# Patient Record
Sex: FEMALE | Race: WHITE | NOT HISPANIC OR LATINO | Employment: FULL TIME | URBAN - METROPOLITAN AREA
[De-identification: names, ages, dates, MRNs, and addresses within clinical notes are randomized per-mention and may not be internally consistent; named-entity substitution may affect disease eponyms.]

---

## 2017-08-11 RX ORDER — ALLOPURINOL 300 MG/1
300 TABLET ORAL DAILY
COMMUNITY

## 2017-08-11 RX ORDER — SIMVASTATIN 20 MG
20 TABLET ORAL NIGHTLY
COMMUNITY

## 2017-08-11 RX ORDER — FUROSEMIDE 40 MG/1
40 TABLET ORAL DAILY
COMMUNITY

## 2017-08-11 RX ORDER — VALSARTAN AND HYDROCHLOROTHIAZIDE 320; 25 MG/1; MG/1
1 TABLET, FILM COATED ORAL DAILY
COMMUNITY

## 2017-08-11 RX ORDER — CLONIDINE HYDROCHLORIDE 0.1 MG/1
0.1 TABLET ORAL
COMMUNITY

## 2017-08-11 RX ORDER — CITALOPRAM 20 MG/1
20 TABLET ORAL DAILY
COMMUNITY

## 2017-08-11 RX ORDER — POTASSIUM CHLORIDE 20 MEQ/1
20 TABLET, EXTENDED RELEASE ORAL 2 TIMES DAILY
COMMUNITY

## 2017-08-11 RX ORDER — METOPROLOL SUCCINATE 100 MG/1
100 TABLET, EXTENDED RELEASE ORAL DAILY
COMMUNITY

## 2017-08-11 RX ORDER — SPIRONOLACTONE 50 MG/1
50 TABLET, FILM COATED ORAL DAILY
COMMUNITY

## 2017-08-14 ENCOUNTER — OFFICE VISIT (OUTPATIENT)
Dept: NEUROSURGERY | Facility: CLINIC | Age: 57
End: 2017-08-14

## 2017-08-14 VITALS
TEMPERATURE: 97.4 F | BODY MASS INDEX: 43.63 KG/M2 | SYSTOLIC BLOOD PRESSURE: 126 MMHG | DIASTOLIC BLOOD PRESSURE: 84 MMHG | WEIGHT: 278 LBS | HEIGHT: 67 IN

## 2017-08-14 DIAGNOSIS — M53.9 MULTILEVEL DEGENERATIVE DISC DISEASE: Primary | ICD-10-CM

## 2017-08-14 DIAGNOSIS — M51.36 BULGING LUMBAR DISC: ICD-10-CM

## 2017-08-14 PROBLEM — M51.369 BULGING LUMBAR DISC: Status: ACTIVE | Noted: 2017-08-14

## 2017-08-14 PROCEDURE — 99203 OFFICE O/P NEW LOW 30 MIN: CPT | Performed by: NEUROLOGICAL SURGERY

## 2017-08-14 RX ORDER — HYDROCODONE BITARTRATE AND ACETAMINOPHEN 7.5; 325 MG/1; MG/1
1 TABLET ORAL EVERY 8 HOURS PRN
Qty: 45 TABLET | Refills: 0 | Status: SHIPPED | OUTPATIENT
Start: 2017-08-14

## 2017-08-14 RX ORDER — TIZANIDINE 4 MG/1
4 TABLET ORAL NIGHTLY PRN
COMMUNITY

## 2017-08-14 RX ORDER — METHOCARBAMOL 750 MG/1
750 TABLET, FILM COATED ORAL NIGHTLY
Qty: 30 TABLET | Refills: 0 | Status: SHIPPED | OUTPATIENT
Start: 2017-08-14 | End: 2017-09-25 | Stop reason: SDUPTHER

## 2017-08-14 RX ORDER — NAPROXEN 500 MG/1
500 TABLET ORAL 2 TIMES DAILY WITH MEALS
COMMUNITY
End: 2017-08-14 | Stop reason: CLARIF

## 2017-08-14 RX ORDER — NABUMETONE 750 MG/1
750 TABLET, FILM COATED ORAL 2 TIMES DAILY
Qty: 60 TABLET | Refills: 0 | Status: SHIPPED | OUTPATIENT
Start: 2017-08-14 | End: 2017-09-25 | Stop reason: SDUPTHER

## 2017-08-14 NOTE — PROGRESS NOTES
Sandra Inabnitt  1960  [id number]      Chief Complaint   Patient presents with   • Back Pain       HISTORY OF PRESENT ILLNESS:  [This is a 56-year-old female who has a history of recurring chronic low back pain.  This particular episode has been of 3 weeks duration.  Walking for long periods of time or sitting exacerbate her symptoms.  In the past she is been to physical therapy with which has helped somewhat.  She has not been to physical therapy this time.  The last episode that she had that was quite this severe was several years ago.    The pain is axial.  I questioned her repeatedly yet received is no history that would suggest radiculopathy.  She has seen a neurosurgeon in [facility] who has scheduled surgery for her in 2 months when she described as plates and screws.  I'm seeing her for a second opinion.]    Past Medical History:   Diagnosis Date   • CKD (chronic kidney disease)    • DDD (degenerative disc disease), lumbar    • Depression    • Diverticulitis    • Hyperlipidemia    • Hypertension    • Lumbar disc disease with radiculopathy    • Obesity    • Peripheral edema    • PUD (peptic ulcer disease)    • Vitamin D deficiency disease        Past Surgical History:   Procedure Laterality Date   • APPENDECTOMY     •  SECTION     • COLECTOMY PARTIAL / TOTAL     • TUBAL ABDOMINAL LIGATION         History reviewed. No pertinent family history.    Social History     Social History   • Marital status:      Spouse name: N/A   • Number of children: N/A   • Years of education: N/A     Occupational History   • Not on file.     Social History Main Topics   • Smoking status: Never Smoker   • Smokeless tobacco: Not on file   • Alcohol use No   • Drug use: No   • Sexual activity: Defer     Other Topics Concern   • Not on file     Social History Narrative       No Known Allergies      Current Outpatient Prescriptions:   •  allopurinol (ZYLOPRIM) 300 MG tablet, Take 300 mg by mouth Daily., Disp:  , Rfl:   •  citalopram (CeleXA) 20 MG tablet, Take 20 mg by mouth Daily., Disp: , Rfl:   •  CloNIDine (CATAPRES) 0.1 MG tablet, Take 0.1 mg by mouth every night at bedtime., Disp: , Rfl:   •  furosemide (LASIX) 40 MG tablet, Take 40 mg by mouth Daily., Disp: , Rfl:   •  metoprolol succinate XL (TOPROL-XL) 100 MG 24 hr tablet, Take 100 mg by mouth Daily., Disp: , Rfl:   •  naproxen (NAPROSYN) 500 MG tablet, Take 500 mg by mouth 2 (Two) Times a Day With Meals., Disp: , Rfl:   •  potassium chloride (K-DUR,KLOR-CON) 20 MEQ CR tablet, Take 20 mEq by mouth 2 (Two) Times a Day., Disp: , Rfl:   •  simvastatin (ZOCOR) 20 MG tablet, Take 20 mg by mouth Every Night., Disp: , Rfl:   •  spironolactone (ALDACTONE) 50 MG tablet, Take 50 mg by mouth Daily., Disp: , Rfl:   •  tiZANidine (ZANAFLEX) 4 MG tablet, Take 4 mg by mouth At Night As Needed for Muscle Spasms., Disp: , Rfl:   •  valsartan-hydrochlorothiazide (DIOVAN-HCT) 320-25 MG per tablet, Take 1 tablet by mouth Daily., Disp: , Rfl:     Review of Systems   Constitutional: Negative for activity change, appetite change, chills, diaphoresis, fatigue, fever and unexpected weight change.   HENT: Negative for congestion, dental problem, drooling, ear discharge, ear pain, facial swelling, hearing loss, mouth sores, nosebleeds, postnasal drip, rhinorrhea, sinus pressure, sneezing, sore throat, tinnitus, trouble swallowing and voice change.    Eyes: Negative for photophobia, pain, discharge, redness, itching and visual disturbance.   Respiratory: Negative for apnea, cough, choking, chest tightness, shortness of breath, wheezing and stridor.    Cardiovascular: Negative for chest pain, palpitations and leg swelling.   Gastrointestinal: Negative for abdominal distention, abdominal pain, anal bleeding, blood in stool, constipation, diarrhea, nausea, rectal pain and vomiting.   Endocrine: Negative for cold intolerance, heat intolerance, polydipsia, polyphagia and polyuria.  "  Genitourinary: Negative for decreased urine volume, difficulty urinating, dysuria, enuresis, flank pain, frequency, genital sores, hematuria and urgency.   Musculoskeletal: Positive for back pain. Negative for arthralgias, gait problem, joint swelling, myalgias, neck pain and neck stiffness.   Skin: Negative for color change, pallor, rash and wound.   Allergic/Immunologic: Negative for environmental allergies, food allergies and immunocompromised state.   Neurological: Negative for dizziness, tremors, seizures, syncope, facial asymmetry, speech difficulty, weakness, light-headedness, numbness and headaches.   Hematological: Negative for adenopathy. Does not bruise/bleed easily.   Psychiatric/Behavioral: Negative for agitation, behavioral problems, confusion, decreased concentration, dysphoric mood, hallucinations, self-injury, sleep disturbance and suicidal ideas. The patient is not nervous/anxious and is not hyperactive.        Vitals:    08/14/17 1141   BP: 126/84   BP Location: Left arm   Patient Position: Sitting   Temp: 97.4 °F (36.3 °C)   TempSrc: Temporal Artery    Weight: 278 lb (126 kg)   Height: 67\" (170.2 cm)       Neurological Examination:  Mental status/speech: The patient is alert and oriented.  Speech is clear without aphysia or dysarthria.  No overt cognitive deficits.    Cranial nerve examination:    Olfaction: Smell is intact.  Vision: Vision is intact without visual field abnormalities.  Funduscopic examination is normal.  No pupillary irregularity.  Ocular motor examination: The extraocular muscles are intact.  There is no diplopia.  The pupil is round and reactive to both light and accommodation.  There is no nystagmus.  Facial movement/sensation: There is no facial weakness.  Sensation is intact in the first, second, and third divisions of the trigeminal nerve.  The corneal reflex is intact.  Auditory: Hearing is intact to finger rub bilaterally.  Cranial nerves IX, X, XI, XII: Phonation is " normal.  No dysphagia.  Tongue is protruded in the midline without atrophy.  The gag reflex is intact.  Shoulder shrug is normal.    Musculoligamentous ligamentous examination: She is obese with limitation of range of motion of lumbar spine.  Straight leg raising, [place] and flip test are negative.  I am unable to find any evidence of weakness, sensory loss or reflex asymmetry.  Her gait is normal.    The lumbar MRI shows degenerative disc disease.  It is particularly severe at L3-L4.  She has a disc protrusion deviating toward the right at this level.  She has facet arthrosis and mild disc degenerations otherwise.    Medical Decision Making:     Diagnostic Data Set:  Degenerative disc disease L3-L4      Assessment:  Although the MRI shows disc protrusion to the right at L3-L4 she has no symptoms of radiculopathy.  The primary complaint she has is severe, non-radicular back pain.  For that reason I do not believe that surgical intervention is in her best interest.  There is no spinal operation that is going to help back pain in and of itself.  Indeed the question would be which is the symptomatic level.  L3-L4 certainly is not normal and most likely is the culprit.  However before any consideration of surgery a full conservative course should be exhausted and this level should be determined to be the area of interest and symptomatic.    I have sent her to physical therapy, provided a prescription of Relafen 750 mg twice a day, Robaxin 750 mg at night and Lortab 7.5/325 (45) Q8H when necessary.  She is to call me in 2 weeks.  At that time if she is not better I would recommend epidural steroid injection.  If it any time she should develop radiculopathy in the right anterior thigh surgery would be a consideration.  At the present time however I cannot confirm that surgery is an absolute requirement.,          Recommendations:  [ ]        I greatly appreciate the opportunity to see and evaluate this individual.  If you  have questions or concerns regarding issues that I may have overlooked please call me at any time: [phone].  Kenan Bailey M.D.  [facility]  [address] [place] [address]    Scribed for Maximino Bailey MD by Lissett Stoll CMA. 8/14/2017  12:08 PM    I have read and concur with the information provided by the scribe.  Maximino Bailey MD

## 2017-08-14 NOTE — PATIENT INSTRUCTIONS
Call Dr. Bailey on a Monday or Tuesday.   Ask for Basilia,  and leave a message for  Dr. Bailey.  He will call you back at the end of the day as soon as he can.     [phone]

## 2017-09-25 DIAGNOSIS — M51.36 BULGING LUMBAR DISC: ICD-10-CM

## 2017-09-25 DIAGNOSIS — M53.9 MULTILEVEL DEGENERATIVE DISC DISEASE: ICD-10-CM

## 2017-09-25 RX ORDER — NABUMETONE 750 MG/1
TABLET, FILM COATED ORAL
Qty: 60 TABLET | Refills: 0 | Status: SHIPPED | OUTPATIENT
Start: 2017-09-25 | End: 2017-10-27 | Stop reason: SDUPTHER

## 2017-09-25 RX ORDER — METHOCARBAMOL 750 MG/1
TABLET, FILM COATED ORAL
Qty: 30 TABLET | Refills: 0 | Status: SHIPPED | OUTPATIENT
Start: 2017-09-25 | End: 2017-10-27 | Stop reason: SDUPTHER

## 2017-10-27 DIAGNOSIS — M53.9 MULTILEVEL DEGENERATIVE DISC DISEASE: ICD-10-CM

## 2017-10-27 DIAGNOSIS — M51.36 BULGING LUMBAR DISC: ICD-10-CM

## 2017-10-27 RX ORDER — METHOCARBAMOL 750 MG/1
TABLET, FILM COATED ORAL
Qty: 30 TABLET | Refills: 0 | Status: SHIPPED | OUTPATIENT
Start: 2017-10-27 | End: 2017-12-04 | Stop reason: SDUPTHER

## 2017-10-27 RX ORDER — NABUMETONE 750 MG/1
TABLET, FILM COATED ORAL
Qty: 60 TABLET | Refills: 0 | Status: SHIPPED | OUTPATIENT
Start: 2017-10-27 | End: 2017-12-04 | Stop reason: SDUPTHER

## 2017-10-27 NOTE — TELEPHONE ENCOUNTER
Provider:  Leo  Caller:  Automated refill request  Surgery:  NA  Surgery Date:    Last visit:  08/14/17  Next visit: NA    Reason for call:         Requested Prescriptions     Pending Prescriptions Disp Refills   • methocarbamol (ROBAXIN) 750 MG tablet [Pharmacy Med Name: METHOCARBAMOL 750MG TABLET 750 TAB] 30 tablet 0     Sig: TAKE 1 TABLET BY MOUTH EVERY NIGHT   • nabumetone (RELAFEN) 750 MG tablet [Pharmacy Med Name: NABUMETONE 750MG TABLET 750 TAB] 60 tablet 0     Sig: TAKE 1 TABLET TWICE DAILY

## 2017-12-04 DIAGNOSIS — M51.36 BULGING LUMBAR DISC: ICD-10-CM

## 2017-12-04 DIAGNOSIS — M53.9 MULTILEVEL DEGENERATIVE DISC DISEASE: ICD-10-CM

## 2017-12-04 RX ORDER — NABUMETONE 750 MG/1
TABLET, FILM COATED ORAL
Qty: 60 TABLET | Refills: 0 | Status: SHIPPED | OUTPATIENT
Start: 2017-12-04 | End: 2018-01-02 | Stop reason: SDUPTHER

## 2017-12-04 RX ORDER — METHOCARBAMOL 750 MG/1
TABLET, FILM COATED ORAL
Qty: 30 TABLET | Refills: 0 | Status: SHIPPED | OUTPATIENT
Start: 2017-12-04 | End: 2018-01-02 | Stop reason: SDUPTHER

## 2017-12-04 NOTE — TELEPHONE ENCOUNTER
Provider:  Leo  Caller:  Automated refill request  Surgery:  NA  Surgery Date:    Last visit:  08/14/17  Next visit:      Reason for call:         Requested Prescriptions     Pending Prescriptions Disp Refills   • methocarbamol (ROBAXIN) 750 MG tablet [Pharmacy Med Name: METHOCARBAMOL 750MG TABLET 750 TAB] 30 tablet 0     Sig: TAKE 1 TABLET EVERY NIGHT   • nabumetone (RELAFEN) 750 MG tablet [Pharmacy Med Name: NABUMETONE 750MG TABLET 750 TAB] 60 tablet 0     Sig: TAKE 1 TABLET TWICE DAILY

## 2018-01-02 DIAGNOSIS — M53.9 MULTILEVEL DEGENERATIVE DISC DISEASE: ICD-10-CM

## 2018-01-02 DIAGNOSIS — M51.36 BULGING LUMBAR DISC: ICD-10-CM

## 2018-01-02 RX ORDER — METHOCARBAMOL 750 MG/1
TABLET, FILM COATED ORAL
Qty: 30 TABLET | Refills: 0 | Status: SHIPPED | OUTPATIENT
Start: 2018-01-02

## 2018-01-02 RX ORDER — NABUMETONE 750 MG/1
TABLET, FILM COATED ORAL
Qty: 60 TABLET | Refills: 0 | Status: SHIPPED | OUTPATIENT
Start: 2018-01-02 | End: 2018-02-01 | Stop reason: SDUPTHER

## 2018-01-02 NOTE — TELEPHONE ENCOUNTER
Provider:  Leo  Caller:  Leti   Last visit:  08/14/17  Next visit:  tbd        Reason for call:         Automated refill requests.

## 2018-02-01 DIAGNOSIS — M53.9 MULTILEVEL DEGENERATIVE DISC DISEASE: ICD-10-CM

## 2018-02-01 DIAGNOSIS — M51.36 BULGING LUMBAR DISC: ICD-10-CM

## 2018-02-01 RX ORDER — NABUMETONE 750 MG/1
TABLET, FILM COATED ORAL
Qty: 60 TABLET | Refills: 3 | Status: SHIPPED | OUTPATIENT
Start: 2018-02-01 | End: 2018-05-30 | Stop reason: SDUPTHER

## 2018-02-01 NOTE — TELEPHONE ENCOUNTER
Provider:  Leo  Caller:  Leti   Last visit:  08/14/17  Next visit:  tbd         Reason for call:         Automated refill request

## 2018-02-28 DIAGNOSIS — M53.9 MULTILEVEL DEGENERATIVE DISC DISEASE: ICD-10-CM

## 2018-02-28 DIAGNOSIS — M51.36 BULGING LUMBAR DISC: ICD-10-CM

## 2018-03-01 RX ORDER — METHOCARBAMOL 750 MG/1
TABLET, FILM COATED ORAL
Qty: 30 TABLET | Refills: 0 | OUTPATIENT
Start: 2018-03-01

## 2018-03-01 NOTE — TELEPHONE ENCOUNTER
Provider:  Jarek  Caller: Leti  Time of call:   02/28/18  Phone #:    Surgery: no   Surgery Date:    Last visit: 08/14/17    Next visit: None    CATARINO:         Reason for call:     Refill request

## 2018-05-30 DIAGNOSIS — M51.36 BULGING LUMBAR DISC: ICD-10-CM

## 2018-05-30 DIAGNOSIS — M53.9 MULTILEVEL DEGENERATIVE DISC DISEASE: ICD-10-CM

## 2018-05-30 RX ORDER — NABUMETONE 750 MG/1
TABLET, FILM COATED ORAL
Qty: 60 TABLET | Refills: 0 | Status: SHIPPED | OUTPATIENT
Start: 2018-05-30 | End: 2018-06-27 | Stop reason: SDUPTHER

## 2018-06-27 DIAGNOSIS — M53.9 MULTILEVEL DEGENERATIVE DISC DISEASE: ICD-10-CM

## 2018-06-27 DIAGNOSIS — M51.36 BULGING LUMBAR DISC: ICD-10-CM

## 2018-06-27 NOTE — TELEPHONE ENCOUNTER
Provider: Leo   Caller: pharmacy  Time of call:     Phone #: [phone]    Surgery: n/a   Surgery Date:    Last visit: 8/14/17    Next visit:     CATARINO:         Reason for call:       Refill request

## 2018-06-28 RX ORDER — NABUMETONE 750 MG/1
TABLET, FILM COATED ORAL
Qty: 60 TABLET | Refills: 0 | Status: SHIPPED | OUTPATIENT
Start: 2018-06-28 | End: 2018-08-14 | Stop reason: SDUPTHER

## 2018-06-28 NOTE — TELEPHONE ENCOUNTER
Called pt and left detailed vm adv that we have provided 1 final refill on the nabumetone and that final refills would need to come from her pcp

## 2018-08-10 DIAGNOSIS — M51.36 BULGING LUMBAR DISC: ICD-10-CM

## 2018-08-10 DIAGNOSIS — M53.9 MULTILEVEL DEGENERATIVE DISC DISEASE: ICD-10-CM

## 2018-08-10 RX ORDER — NABUMETONE 750 MG/1
TABLET, FILM COATED ORAL
Qty: 60 TABLET | Refills: 11 | OUTPATIENT
Start: 2018-08-10

## 2018-08-14 DIAGNOSIS — M51.36 BULGING LUMBAR DISC: ICD-10-CM

## 2018-08-14 DIAGNOSIS — M53.9 MULTILEVEL DEGENERATIVE DISC DISEASE: ICD-10-CM

## 2018-08-14 RX ORDER — NABUMETONE 750 MG/1
TABLET, FILM COATED ORAL
Qty: 60 TABLET | Refills: 0 | Status: SHIPPED | OUTPATIENT
Start: 2018-08-14 | End: 2018-09-14 | Stop reason: SDUPTHER

## 2018-08-14 NOTE — TELEPHONE ENCOUNTER
Provider: Leo   Caller: pharmacy  Time of call: n/a    Phone #: n/a    Last visit: 8/14/17    Next visit: none  Reason for call:       Automated refill request.

## 2018-09-13 DIAGNOSIS — M51.36 BULGING LUMBAR DISC: ICD-10-CM

## 2018-09-13 DIAGNOSIS — M53.9 MULTILEVEL DEGENERATIVE DISC DISEASE: ICD-10-CM

## 2018-09-13 RX ORDER — NABUMETONE 750 MG/1
TABLET, FILM COATED ORAL
Qty: 60 TABLET | Refills: 0 | OUTPATIENT
Start: 2018-09-13

## 2018-09-13 NOTE — TELEPHONE ENCOUNTER
Provider: Leo  Caller:  Automated refill request  Surgery:  NA  Surgery Date:    Last visit:  08/14/17  Next visit: NA    Reason for call:         Requested Prescriptions     Pending Prescriptions Disp Refills   • nabumetone (RELAFEN) 750 MG tablet [Pharmacy Med Name: NABUMETONE 750MG TABLET 750 TAB] 60 tablet 11     Sig: TAKE 1 TABLET TWICE DAILY

## 2018-09-14 DIAGNOSIS — M51.36 BULGING LUMBAR DISC: ICD-10-CM

## 2018-09-14 DIAGNOSIS — M53.9 MULTILEVEL DEGENERATIVE DISC DISEASE: ICD-10-CM

## 2018-09-14 RX ORDER — NABUMETONE 750 MG/1
TABLET, FILM COATED ORAL
Qty: 60 TABLET | Refills: 0 | Status: SHIPPED | OUTPATIENT
Start: 2018-09-14 | End: 2018-10-13 | Stop reason: SDUPTHER

## 2018-09-14 NOTE — TELEPHONE ENCOUNTER
Provider: Shani   Caller: pharmacy     Phone #: [phone]   Surgery: n/a     Last visit: 8/14/17     Next visit: n/a    CATARINO:         Reason for call:         Requested Prescriptions     Pending Prescriptions Disp Refills   • nabumetone (RELAFEN) 750 MG tablet [Pharmacy Med Name: NABUMETONE 750MG TABLET 750 TAB] 60 tablet 0     Sig: TAKE 1 TABLET TWICE DAILY

## 2018-10-13 DIAGNOSIS — M53.9 MULTILEVEL DEGENERATIVE DISC DISEASE: ICD-10-CM

## 2018-10-13 DIAGNOSIS — M51.36 BULGING LUMBAR DISC: ICD-10-CM

## 2018-10-13 NOTE — TELEPHONE ENCOUNTER
Provider: Shani   Caller: pharmacy     Phone #: [phone]   Surgery: n/a     Last visit: 8/14/17     Next visit: n/a    Reason for call:         Automated refill request.

## 2018-10-15 RX ORDER — NABUMETONE 750 MG/1
TABLET, FILM COATED ORAL
Qty: 60 TABLET | Refills: 11 | Status: SHIPPED | OUTPATIENT
Start: 2018-10-15

## 2024-04-02 ENCOUNTER — OFFICE VISIT (OUTPATIENT)
Dept: CARDIOLOGY | Facility: CLINIC | Age: 64
End: 2024-04-02
Payer: MEDICARE

## 2024-04-02 VITALS
BODY MASS INDEX: 32.71 KG/M2 | OXYGEN SATURATION: 95 % | WEIGHT: 208.4 LBS | DIASTOLIC BLOOD PRESSURE: 78 MMHG | HEART RATE: 103 BPM | SYSTOLIC BLOOD PRESSURE: 138 MMHG | HEIGHT: 67 IN

## 2024-04-02 DIAGNOSIS — I63.81 LACUNAR INFARCTION: ICD-10-CM

## 2024-04-02 DIAGNOSIS — R53.82 CHRONIC FATIGUE: ICD-10-CM

## 2024-04-02 DIAGNOSIS — R06.00 DYSPNEA, UNSPECIFIED TYPE: ICD-10-CM

## 2024-04-02 DIAGNOSIS — G47.33 OSA ON CPAP: ICD-10-CM

## 2024-04-02 DIAGNOSIS — E78.2 MIXED HYPERLIPIDEMIA: ICD-10-CM

## 2024-04-02 DIAGNOSIS — G47.9 SLEEPING DIFFICULTIES: ICD-10-CM

## 2024-04-02 DIAGNOSIS — R06.02 SHORTNESS OF BREATH: ICD-10-CM

## 2024-04-02 DIAGNOSIS — G47.10 HYPERSOMNIA: ICD-10-CM

## 2024-04-02 DIAGNOSIS — R07.2 PRECORDIAL PAIN: ICD-10-CM

## 2024-04-02 DIAGNOSIS — I10 ESSENTIAL HYPERTENSION: Primary | ICD-10-CM

## 2024-04-02 PROBLEM — E78.5 HYPERLIPIDEMIA: Status: ACTIVE | Noted: 2024-04-02

## 2024-04-02 PROBLEM — I63.9 CVA (CEREBRAL VASCULAR ACCIDENT): Status: ACTIVE | Noted: 2024-04-02

## 2024-04-02 PROBLEM — I63.9 CVA (CEREBRAL VASCULAR ACCIDENT): Status: RESOLVED | Noted: 2024-04-02 | Resolved: 2024-04-02

## 2024-04-02 PROBLEM — E55.9 VITAMIN D DEFICIENCY: Status: ACTIVE | Noted: 2024-04-02

## 2024-04-02 PROCEDURE — 93000 ELECTROCARDIOGRAM COMPLETE: CPT | Performed by: INTERNAL MEDICINE

## 2024-04-02 PROCEDURE — 3075F SYST BP GE 130 - 139MM HG: CPT | Performed by: INTERNAL MEDICINE

## 2024-04-02 PROCEDURE — 3078F DIAST BP <80 MM HG: CPT | Performed by: INTERNAL MEDICINE

## 2024-04-02 PROCEDURE — 99204 OFFICE O/P NEW MOD 45 MIN: CPT | Performed by: INTERNAL MEDICINE

## 2024-04-02 RX ORDER — BETAMETHASONE DIPROPIONATE 0.5 MG/G
CREAM TOPICAL
COMMUNITY

## 2024-04-02 RX ORDER — ASPIRIN 81 MG/1
81 TABLET ORAL DAILY
Qty: 30 TABLET | Refills: 11 | Status: SHIPPED | OUTPATIENT
Start: 2024-04-02

## 2024-04-02 RX ORDER — DULOXETIN HYDROCHLORIDE 60 MG/1
60 CAPSULE, DELAYED RELEASE ORAL 2 TIMES DAILY
COMMUNITY

## 2024-04-02 RX ORDER — CLOBETASOL PROPIONATE 0.46 MG/ML
SOLUTION TOPICAL
COMMUNITY
Start: 2023-11-02

## 2024-04-02 RX ORDER — TOPIRAMATE 50 MG/1
50 TABLET, FILM COATED ORAL NIGHTLY
COMMUNITY

## 2024-04-02 RX ORDER — KETOCONAZOLE 20 MG/ML
SHAMPOO TOPICAL EVERY OTHER DAY
COMMUNITY

## 2024-04-02 RX ORDER — METOPROLOL SUCCINATE 50 MG/1
1 TABLET, EXTENDED RELEASE ORAL DAILY
COMMUNITY

## 2024-04-02 NOTE — PROGRESS NOTES
Subjective   Sandra Nova is a 63 y.o. female     Chief Complaint   Patient presents with   • Establish Care     Here for eval. Per pcp   • Chest Pain   • Shortness of Breath       PROBLEM LIST:      Chest Pain  Shortness of breath  HTN  Hyperlipidemia  DOUG, uses CPAP. Followed by Dr. Molina.   Remote lacunar infarct left thalamus per MRI brain at [facility] on 10-3-2018  Peptic ulcer disease s/p partial colectomy    Denies Rheumatic / Scarlet fever    Specialty Problems    None        HPI:    Ms. Sandra Lopez is a 63-year-old female patient of Dr. Nahomi Luna and Dr. Mariya Merchant seen today for evaluation of chest pain and dyspnea.    Ms. Lopez describes chest discomfort of several months duration.  She will have a somewhat sharp and relatively well localized discomfort in the mid axillary line on the left which is sometimes accompanied by a somewhat more vague and less localized retrosternal discomfort.  Symptoms typically occur with physical activity, are not positional or pleuritic.  However, the patient did note chest discomfort in the axillary line when she took a deep breath as part of her physical exam.    The patient describes progressive exertional dyspnea and a progressive decline in functional capacity over period of the last 3 or 4 months.  She now describes class III dyspnea without cough or wheezing.  When chest discomfort is precipitated by activity she notices a significant increase in dyspnea.  She describes no orthopnea but symptoms possibly compatible with paroxysmal nocturnal dyspnea.  She also has symptoms more typical of obstructive sleep apnea.  Ms. Lopez also relates left lower extremity swelling for a period of years.    The patient was hospitalized in 2018 with focal neurologic symptoms.  She was felt to have a TIA at that time.  MRI of the head demonstrated a prior lacunar infarct.  Echocardiography was performed at that time with no obvious cardiac source of embolism but the  patient never had rhythm monitoring and it did not appear that she had a bubble study to assess for right to left shunting.                      PRIOR MEDICATIONS    Current Outpatient Medications on File Prior to Visit   Medication Sig Dispense Refill   • allopurinol (ZYLOPRIM) 300 MG tablet Take 1 tablet by mouth Daily.     • betamethasone dipropionate (DIPROSONE) 0.05 % cream APPLY A THIN LAYER TO THE AFFECTED AREA(S) BY TOPICAL ROUTE ONCE DAILY AS NEEDED     • clobetasol (TEMOVATE) 0.05 % external solution APPLY TO THE AFFECTED SCALP AREA BY TOPICAL ROUTE 2 TIMES PER DAY IN THE MORNING AND EVENING     • CloNIDine (CATAPRES) 0.1 MG tablet Take 1 tablet by mouth Daily As Needed.     • diclofenac (VOLTAREN) 50 MG EC tablet Take 1 tablet by mouth 2 (Two) Times a Day As Needed.     • DULoxetine (CYMBALTA) 60 MG capsule Take 1 capsule by mouth 2 (Two) Times a Day.     • furosemide (LASIX) 40 MG tablet Take 1 tablet by mouth Daily.     • ketoconazole (NIZORAL) 2 % shampoo Apply  topically to the appropriate area as directed Every Other Day.     • methocarbamol (ROBAXIN) 750 MG tablet TAKE 1 TABLET EVERY NIGHT (Patient taking differently: Take 1 tablet by mouth 2 (Two) Times a Day As Needed.) 30 tablet 0   • metoprolol succinate XL (TOPROL-XL) 50 MG 24 hr tablet Take 1 tablet by mouth Daily.     • simvastatin (ZOCOR) 20 MG tablet Take 1 tablet by mouth Every Night.     • topiramate (TOPAMAX) 50 MG tablet Take 1 tablet by mouth Every Night.     • [DISCONTINUED] citalopram (CeleXA) 20 MG tablet Take 20 mg by mouth Daily.     • [DISCONTINUED] HYDROcodone-acetaminophen (NORCO) 7.5-325 MG per tablet Take 1 tablet by mouth Every 8 (Eight) Hours As Needed for Moderate Pain (4-6). 45 tablet 0   • [DISCONTINUED] metoprolol succinate XL (TOPROL-XL) 100 MG 24 hr tablet Take 100 mg by mouth Daily.     • [DISCONTINUED] nabumetone (RELAFEN) 750 MG tablet TAKE 1 TABLET TWICE DAILY 60 tablet 11   • [DISCONTINUED] potassium chloride  (K-DUR,KLOR-CON) 20 MEQ CR tablet Take 20 mEq by mouth 2 (Two) Times a Day.     • [DISCONTINUED] spironolactone (ALDACTONE) 50 MG tablet Take 50 mg by mouth Daily.     • [DISCONTINUED] tiZANidine (ZANAFLEX) 4 MG tablet Take 4 mg by mouth At Night As Needed for Muscle Spasms.     • [DISCONTINUED] valsartan-hydrochlorothiazide (DIOVAN-HCT) 320-25 MG per tablet Take 1 tablet by mouth Daily.       No current facility-administered medications on file prior to visit.       ALLERGIES:    Patient has no known allergies.    PAST MEDICAL HISTORY:    Past Medical History:   Diagnosis Date   • CKD (chronic kidney disease)    • DDD (degenerative disc disease), lumbar    • Depression    • Diverticulitis    • Hyperlipidemia    • Hypertension    • Lumbar disc disease with radiculopathy    • Obesity    • Peripheral edema    • PUD (peptic ulcer disease)    • Sleep apnea     uses CPAP. followed by Dr. Molina.   • Stroke    • Vitamin D deficiency disease        SURGICAL HISTORY:    Past Surgical History:   Procedure Laterality Date   • APPENDECTOMY     •  SECTION     • COLECTOMY PARTIAL / TOTAL     • TUBAL ABDOMINAL LIGATION         SOCIAL HISTORY:    Social History     Socioeconomic History   • Marital status:    Tobacco Use   • Smoking status: Never   • Smokeless tobacco: Never   Substance and Sexual Activity   • Alcohol use: No   • Drug use: No   • Sexual activity: Defer       FAMILY HISTORY:    Family History   Problem Relation Age of Onset   • Heart attack Mother    • Hypertension Mother    • Atrial fibrillation Mother    • Hypertension Father    • Diabetes Father    • Kidney disease Father        Review of Systems   Constitutional:  Positive for fatigue. Negative for chills, diaphoresis, fever and unexpected weight change.   HENT: Negative.     Eyes:  Positive for visual disturbance (glasses).   Respiratory:  Positive for shortness of breath.    Cardiovascular:  Positive for chest pain (occas. pain to center of  "chest \"but nothing major\". usully occurs with activity and resolves with stopping. lasts sec's. Episodes couple x a week. Increased freq. first noticed a couple of mo. ago.) and leg swelling (left for \"years\" but worsening). Negative for palpitations.   Gastrointestinal:  Negative for blood in stool (denies melena,hemoptysis), constipation and diarrhea.   Endocrine: Negative for cold intolerance and heat intolerance.   Genitourinary: Negative.    Musculoskeletal:  Positive for arthralgias and myalgias.   Skin: Negative.    Allergic/Immunologic: Negative.    Neurological: Negative.    Hematological:  Bruises/bleeds easily.   Psychiatric/Behavioral:  Positive for sleep disturbance.        VISIT VITALS:  Vitals:    04/02/24 1516   BP: 138/78   BP Location: Left arm   Patient Position: Sitting   Pulse: 103   SpO2: 95%   Weight: 94.5 kg (208 lb 6.4 oz)   Height: 170.2 cm (67\")      /78 (BP Location: Left arm, Patient Position: Sitting)   Pulse 103   Ht 170.2 cm (67\")   Wt 94.5 kg (208 lb 6.4 oz)   SpO2 95%   BMI 32.64 kg/m²     RECENT LABS:    Objective       Physical Exam  Vitals and nursing note reviewed.   Constitutional:       General: She is not in acute distress.     Appearance: She is well-developed.   HENT:      Head: Normocephalic and atraumatic.   Eyes:      Conjunctiva/sclera: Conjunctivae normal.      Pupils: Pupils are equal, round, and reactive to light.      Comments: No ptosis or lid lag  Extra ocular motions intact  CLIFTON   Neck:      Vascular: No carotid bruit, hepatojugular reflux or JVD.      Trachea: No tracheal deviation.      Comments: Nl. Carotid upstrokes  Cardiovascular:      Rate and Rhythm: Normal rate and regular rhythm.      Pulses:           Radial pulses are 2+ on the right side and 2+ on the left side.      Heart sounds: Normal heart sounds, S1 normal and S2 normal. No murmur heard.     No friction rub. No S3 or S4 sounds.   Pulmonary:      Effort: Pulmonary effort is normal. "      Breath sounds: Normal breath sounds. Examination of the right-lower field reveals rhonchi. Rhonchi (scant) present. No wheezing or rales.      Comments: Nl. Expir. Phase  Nl. Breath sound intensity  Abdominal:      General: Bowel sounds are normal. There is no distension or abdominal bruit.      Palpations: Abdomen is soft. There is no mass.      Tenderness: There is no abdominal tenderness. There is no guarding or rebound.      Comments: No organomegaly  Palpable liver edge 2 cm below costal margin on deep inspiration   Musculoskeletal:         General: No tenderness or deformity. Normal range of motion.      Cervical back: Normal range of motion and neck supple.      Right lower leg: Edema present.      Left lower leg: No edema.      Comments: LLE, lymphedema, palpable PT pedal pulse  RLE, trace edema, palpable PT pedal pulse   Skin:     General: Skin is warm and dry.      Coloration: Skin is not pale.      Findings: No erythema or rash.   Neurological:      Mental Status: She is alert and oriented to person, place, and time.   Psychiatric:         Behavior: Behavior normal.         Thought Content: Thought content normal.         Judgment: Judgment normal.         ECG 12 Lead    Date/Time: 4/2/2024 3:31 PM  Performed by: Jovany Biggs MD    Authorized by: Jovany Biggs MD  Comparison: compared with previous ECG from 10/3/2018  Comments: Sinus at 87.  Minor nonspecific IVCD.  Nonspecific T wave changes.  A single PVC.  T wave abnormality is more pronounced than on the patient's EKG from 2018.  Also that study appeared to demonstrate mild ST segment elevation and GA depression suggesting the possibility of pericarditis.  These changes are no longer present.          Assessment & Plan   #1.  Chest pain.  The patient describes chest symptoms atypical for angina.  However, symptoms are frequently associated with exertion and accompanied by shortness of breath.  Therefore, ischemia needs to be evaluated  as a cause.  Because of class III exertional dyspnea we will perform pharmacologic stress testing.  The patient will start aspirin 81 mg daily.  We will not give her prescription for nitroglycerin as her symptoms are so short-lived.    2.  Increased exertional dyspnea and decreased functional capacity with symptoms possibly representing PND.  We will use echocardiography to assess LV systolic and diastolic function, LV filling pressures and pulmonary pressures given those symptoms.  We will check D-dimer to assess the possibility of pulmonary embolism as a contributor.    3.  History of prior stroke.  At the time of her evaluation 2018 the patient also had acute renal injury.  This raises the possibility of multi organ embolic events or vasculitis.  The patient will have a bubble study at the time of her echo and we will also get basic inflammatory markers.    4.  Symptoms strongly suggestive of sleep apnea.  We will arrange for at home sleep study.    5.  Dyslipidemia.  I would like to check fasting lipid profile and liver enzymes and adjust statin dosing accordingly.    6.  The patient will follow with Ricardo Luna and Mayur as instructed we will plan on seeing her in follow-up after testing or on appearing basis as discussed in detail today.   Diagnosis Plan   1. Essential hypertension        2. Mixed hyperlipidemia        3. Precordial pain        4. Shortness of breath        5. DOUG on CPAP        6. Cerebrovascular accident (CVA), unspecified mechanism            No follow-ups on file.         Sandra Nova  reports that she has never smoked. She has never used smokeless tobacco. I have educated her on the risk of diseases from using tobacco products such as cancer, COPD, and heart disease.             BMI is >= 30 and <35. (Class 1 Obesity). The following options were offered after discussion;: pcp addressing             Electronically signed by:    Scribed for Jovany Biggs MD by Leyla Villalobos LPN on  April 2, 2024  at 15:32 EDT    I, Jovany Biggs MD personally performed the services described in this documentation as scribed by the above named individual in my presence, and it is both accurate and complete. April 2, 2024 15:32 EDT      Dictated Utilizing Dragon Dictation: Part of this note may be an electronic transcription/translation of spoken language to printed text using the Dragon Dictation System.

## 2024-04-03 ENCOUNTER — LAB (OUTPATIENT)
Dept: LAB | Facility: HOSPITAL | Age: 64
End: 2024-04-03
Payer: MEDICARE

## 2024-04-03 DIAGNOSIS — E78.2 MIXED HYPERLIPIDEMIA: ICD-10-CM

## 2024-04-03 LAB
ALBUMIN SERPL-MCNC: 4 G/DL (ref 3.5–5.2)
ALP SERPL-CCNC: 116 U/L (ref 39–117)
ALT SERPL W P-5'-P-CCNC: 14 U/L (ref 1–33)
AST SERPL-CCNC: 20 U/L (ref 1–32)
BILIRUB CONJ SERPL-MCNC: 0.2 MG/DL (ref 0–0.3)
BILIRUB INDIRECT SERPL-MCNC: 0.4 MG/DL
BILIRUB SERPL-MCNC: 0.6 MG/DL (ref 0–1.2)
CHOLEST SERPL-MCNC: 175 MG/DL (ref 0–200)
HDLC SERPL-MCNC: 57 MG/DL (ref 40–60)
LDLC SERPL CALC-MCNC: 104 MG/DL (ref 0–100)
LDLC/HDLC SERPL: 1.81 {RATIO}
PROT SERPL-MCNC: 7 G/DL (ref 6–8.5)
TRIGL SERPL-MCNC: 73 MG/DL (ref 0–150)
VLDLC SERPL-MCNC: 14 MG/DL (ref 5–40)

## 2024-04-03 PROCEDURE — 86431 RHEUMATOID FACTOR QUANT: CPT | Performed by: INTERNAL MEDICINE

## 2024-04-03 PROCEDURE — 86140 C-REACTIVE PROTEIN: CPT | Performed by: INTERNAL MEDICINE

## 2024-04-03 PROCEDURE — 85379 FIBRIN DEGRADATION QUANT: CPT | Performed by: INTERNAL MEDICINE

## 2024-04-03 PROCEDURE — 86225 DNA ANTIBODY NATIVE: CPT | Performed by: INTERNAL MEDICINE

## 2024-04-03 PROCEDURE — 80076 HEPATIC FUNCTION PANEL: CPT

## 2024-04-03 PROCEDURE — 80061 LIPID PANEL: CPT

## 2024-04-03 PROCEDURE — 86038 ANTINUCLEAR ANTIBODIES: CPT | Performed by: INTERNAL MEDICINE

## 2024-04-10 ENCOUNTER — TELEPHONE (OUTPATIENT)
Dept: CARDIOLOGY | Facility: CLINIC | Age: 64
End: 2024-04-10
Payer: MEDICARE

## 2024-04-10 DIAGNOSIS — E78.2 MIXED HYPERLIPIDEMIA: Primary | ICD-10-CM

## 2024-04-10 RX ORDER — ATORVASTATIN CALCIUM 20 MG/1
20 TABLET, FILM COATED ORAL DAILY
Qty: 30 TABLET | Refills: 11 | Status: SHIPPED | OUTPATIENT
Start: 2024-04-10

## 2024-04-10 NOTE — TELEPHONE ENCOUNTER
CHOL. RESULTS BRIEFLY DISCUSSED AND AWARE TO D/C SIMVASTATIN AND LIPITOR 20 MG DAILY SENT TO [facility]. AWARE TO HAVE FASTING LABS DRAWN AT OUR LAB IN 8 WEEKS. VERBALIZED SHE UNDERSTOOD. DAMIEN ZAYAS          ----- Message from Jovany Biggs MD sent at 4/9/2024  9:28 AM EDT -----  Stop simvastatin.  Start a atorvastatin 20 mg daily.  Check labs in 6 to 8 weeks.  ----- Message -----  From: Lab, Background User  Sent: 4/3/2024   2:37 PM EDT  To: Jovany Biggs MD

## 2024-04-17 ENCOUNTER — TELEPHONE (OUTPATIENT)
Dept: CARDIOLOGY | Facility: CLINIC | Age: 64
End: 2024-04-17
Payer: MEDICARE

## 2024-04-17 NOTE — TELEPHONE ENCOUNTER
Pt returned Emelyn Dykes.  Pt states she was unaware there were issues with her monitor.  Pt instructed to call the number on the monitor box for them to assist her with troubleshooting.  Pt will call our office back if she needs anything further.

## 2024-04-17 NOTE — TELEPHONE ENCOUNTER
RELAY    Tried to call pt to see if she was having problems with her monitor. Per [employer] they have not got any transmissions for 5 days. There was no voicemail setup. I will try calling later today.

## 2024-04-23 ENCOUNTER — HOSPITAL ENCOUNTER (OUTPATIENT)
Dept: CARDIOLOGY | Facility: HOSPITAL | Age: 64
Discharge: HOME | End: 2024-04-23
Payer: MEDICARE

## 2024-04-23 DIAGNOSIS — I63.81 LACUNAR INFARCTION: ICD-10-CM

## 2024-04-23 DIAGNOSIS — R07.2 PRECORDIAL PAIN: ICD-10-CM

## 2024-04-23 DIAGNOSIS — R06.02 SHORTNESS OF BREATH: ICD-10-CM

## 2024-04-23 DIAGNOSIS — E78.2 MIXED HYPERLIPIDEMIA: ICD-10-CM

## 2024-04-23 DIAGNOSIS — G47.33 OSA ON CPAP: ICD-10-CM

## 2024-04-23 DIAGNOSIS — I10 ESSENTIAL HYPERTENSION: ICD-10-CM

## 2024-04-23 LAB
BH CV REST NUCLEAR ISOTOPE DOSE: 10 MCI
BH CV STRESS COMMENTS STAGE 1: NORMAL
BH CV STRESS DOSE REGADENOSON STAGE 1: 0.4
BH CV STRESS DURATION MIN STAGE 1: 0
BH CV STRESS DURATION SEC STAGE 1: 10
BH CV STRESS NUCLEAR ISOTOPE DOSE: 30 MCI
BH CV STRESS PROTOCOL 1: NORMAL
BH CV STRESS RECOVERY BP: NORMAL MMHG
BH CV STRESS RECOVERY HR: 95 BPM
BH CV STRESS STAGE 1: 1
MAXIMAL PREDICTED HEART RATE: 158 BPM
PERCENT MAX PREDICTED HR: 60.76 %
STRESS BASELINE BP: NORMAL MMHG
STRESS BASELINE HR: 88 BPM
STRESS PERCENT HR: 71 %
STRESS POST PEAK BP: NORMAL MMHG
STRESS POST PEAK HR: 96 BPM
STRESS TARGET HR: 134 BPM

## 2024-04-23 PROCEDURE — A9500 TC99M SESTAMIBI: HCPCS | Performed by: INTERNAL MEDICINE

## 2024-04-23 PROCEDURE — 78452 HT MUSCLE IMAGE SPECT MULT: CPT | Performed by: INTERNAL MEDICINE

## 2024-04-23 PROCEDURE — 93018 CV STRESS TEST I&R ONLY: CPT | Performed by: INTERNAL MEDICINE

## 2024-04-23 PROCEDURE — 0 TECHNETIUM SESTAMIBI: Performed by: INTERNAL MEDICINE

## 2024-04-23 PROCEDURE — 93017 CV STRESS TEST TRACING ONLY: CPT

## 2024-04-23 PROCEDURE — 78452 HT MUSCLE IMAGE SPECT MULT: CPT

## 2024-04-23 PROCEDURE — 93306 TTE W/DOPPLER COMPLETE: CPT

## 2024-04-23 PROCEDURE — 25010000002 REGADENOSON 0.4 MG/5ML SOLUTION: Performed by: INTERNAL MEDICINE

## 2024-04-23 RX ORDER — REGADENOSON 0.08 MG/ML
0.4 INJECTION, SOLUTION INTRAVENOUS
Status: COMPLETED | OUTPATIENT
Start: 2024-04-23 | End: 2024-04-23

## 2024-04-23 RX ADMIN — TECHNETIUM TC 99M SESTAMIBI 1 DOSE: 1 INJECTION INTRAVENOUS at 10:23

## 2024-04-23 RX ADMIN — REGADENOSON 0.4 MG: 0.08 INJECTION, SOLUTION INTRAVENOUS at 10:23

## 2024-04-23 RX ADMIN — TECHNETIUM TC 99M SESTAMIBI 1 DOSE: 1 INJECTION INTRAVENOUS at 08:20

## 2024-04-25 ENCOUNTER — TELEPHONE (OUTPATIENT)
Dept: CARDIOLOGY | Facility: CLINIC | Age: 64
End: 2024-04-25
Payer: MEDICARE

## 2024-04-25 NOTE — TELEPHONE ENCOUNTER
PATIENT NOTIFIED STRESS TEST WAS ABNL.A ND WILL BE CALLED WITH A SOONER 1-2 WEEK F/U APPT. VERBALIZED OK. MSG. SENT TO [facility] TO SCHEDULE AND CALL. DAMIEN ZAYAS            ----- Message from Jovany Biggs MD sent at 4/25/2024  1:18 PM EDT -----  Office follow-up 1 to 2 weeks to discuss cardiac catheterization based on the abnormal stress test finding and symptoms  ----- Message -----  From: Jovany Biggs MD  Sent: 4/23/2024   7:54 PM EDT  To: Jovany Biggs MD

## 2024-04-26 ENCOUNTER — TELEPHONE (OUTPATIENT)
Dept: CARDIOLOGY | Facility: CLINIC | Age: 64
End: 2024-04-26

## 2024-04-26 NOTE — TELEPHONE ENCOUNTER
Caller: Deni Nova    Relationship: Self    Best call back number: [phone]     What is the best time to reach you: ANYTIME    Do you know the name of the person who called: DENI NOVA    What was the call regarding: PATIENT MISSED A CALL ON 04.25.24. PERHAPS TO SCHEDULE HER SOONER FOLLOW UP.

## 2024-04-28 LAB
AORTIC DIMENSIONLESS INDEX: 0.7 (DI)
BH CV ECHO MEAS - ACS: 1.65 CM
BH CV ECHO MEAS - AO MAX PG: 6.4 MMHG
BH CV ECHO MEAS - AO MEAN PG: 3 MMHG
BH CV ECHO MEAS - AO ROOT DIAM: 3.3 CM
BH CV ECHO MEAS - AO V2 MAX: 126 CM/SEC
BH CV ECHO MEAS - AO V2 VTI: 27.1 CM
BH CV ECHO MEAS - AVA(I,D): 2.6 CM2
BH CV ECHO MEAS - EDV(CUBED): 69.9 ML
BH CV ECHO MEAS - EDV(MOD-SP4): 74.2 ML
BH CV ECHO MEAS - EF(MOD-SP4): 57.3 %
BH CV ECHO MEAS - EF_3D-VOL: 59 %
BH CV ECHO MEAS - ESV(CUBED): 24.9 ML
BH CV ECHO MEAS - ESV(MOD-SP4): 31.7 ML
BH CV ECHO MEAS - FS: 29.1 %
BH CV ECHO MEAS - IVS/LVPW: 0.78 CM
BH CV ECHO MEAS - IVSD: 0.97 CM
BH CV ECHO MEAS - LA A2CS (ATRIAL LENGTH): 5.5 CM
BH CV ECHO MEAS - LA A4C LENGTH: 5.6 CM
BH CV ECHO MEAS - LA DIMENSION: 3.5 CM
BH CV ECHO MEAS - LAT PEAK E' VEL: 10.2 CM/SEC
BH CV ECHO MEAS - LV DIASTOLIC VOL/BSA (35-75): 36.1 CM2
BH CV ECHO MEAS - LV MASS(C)D: 154.7 GRAMS
BH CV ECHO MEAS - LV MAX PG: 3.6 MMHG
BH CV ECHO MEAS - LV MEAN PG: 2 MMHG
BH CV ECHO MEAS - LV SYSTOLIC VOL/BSA (12-30): 15.4 CM2
BH CV ECHO MEAS - LV V1 MAX: 94.7 CM/SEC
BH CV ECHO MEAS - LV V1 VTI: 18.5 CM
BH CV ECHO MEAS - LVIDD: 4.1 CM
BH CV ECHO MEAS - LVIDS: 2.9 CM
BH CV ECHO MEAS - LVOT AREA: 3.8 CM2
BH CV ECHO MEAS - LVOT DIAM: 2.2 CM
BH CV ECHO MEAS - LVPWD: 1.25 CM
BH CV ECHO MEAS - MED PEAK E' VEL: 6.4 CM/SEC
BH CV ECHO MEAS - MV A MAX VEL: 85 CM/SEC
BH CV ECHO MEAS - MV DEC SLOPE: 407 CM/SEC2
BH CV ECHO MEAS - MV DEC TIME: 0.15 SEC
BH CV ECHO MEAS - MV E MAX VEL: 70.1 CM/SEC
BH CV ECHO MEAS - MV E/A: 0.82
BH CV ECHO MEAS - MV MAX PG: 3.5 MMHG
BH CV ECHO MEAS - MV MEAN PG: 2 MMHG
BH CV ECHO MEAS - MV P1/2T: 53.4 MSEC
BH CV ECHO MEAS - MV V2 VTI: 22.6 CM
BH CV ECHO MEAS - MVA(P1/2T): 4.1 CM2
BH CV ECHO MEAS - MVA(VTI): 3.1 CM2
BH CV ECHO MEAS - PA V2 MAX: 89 CM/SEC
BH CV ECHO MEAS - RAP SYSTOLE: 10 MMHG
BH CV ECHO MEAS - RV MAX PG: 0.95 MMHG
BH CV ECHO MEAS - RV V1 MAX: 48.7 CM/SEC
BH CV ECHO MEAS - RV V1 VTI: 10.3 CM
BH CV ECHO MEAS - RVDD: 2.8 CM
BH CV ECHO MEAS - RVSP: 34.6 MMHG
BH CV ECHO MEAS - SV(LVOT): 70.3 ML
BH CV ECHO MEAS - SV(MOD-SP4): 42.5 ML
BH CV ECHO MEAS - SVI(LVOT): 34.2 ML/M2
BH CV ECHO MEAS - SVI(MOD-SP4): 20.7 ML/M2
BH CV ECHO MEAS - TAPSE (>1.6): 1.89 CM
BH CV ECHO MEAS - TR MAX PG: 24.6 MMHG
BH CV ECHO MEAS - TR MAX VEL: 248 CM/SEC
BH CV ECHO MEASUREMENTS AVERAGE E/E' RATIO: 8.45
LEFT ATRIUM VOLUME INDEX: 17.9 ML/M2
LEFT ATRIUM VOLUME: 37 ML

## 2024-04-29 ENCOUNTER — OFFICE VISIT (OUTPATIENT)
Dept: CARDIOLOGY | Facility: CLINIC | Age: 64
End: 2024-04-29
Payer: MEDICARE

## 2024-04-29 ENCOUNTER — TELEPHONE (OUTPATIENT)
Dept: CARDIOLOGY | Facility: CLINIC | Age: 64
End: 2024-04-29

## 2024-04-29 ENCOUNTER — TELEPHONE (OUTPATIENT)
Dept: CARDIOLOGY | Facility: CLINIC | Age: 64
End: 2024-04-29
Payer: MEDICARE

## 2024-04-29 VITALS
OXYGEN SATURATION: 97 % | WEIGHT: 205.4 LBS | BODY MASS INDEX: 32.24 KG/M2 | HEIGHT: 67 IN | HEART RATE: 120 BPM | SYSTOLIC BLOOD PRESSURE: 116 MMHG | DIASTOLIC BLOOD PRESSURE: 84 MMHG

## 2024-04-29 DIAGNOSIS — R06.02 SHORTNESS OF BREATH: ICD-10-CM

## 2024-04-29 DIAGNOSIS — R94.39 ABNORMAL STRESS TEST: ICD-10-CM

## 2024-04-29 DIAGNOSIS — R07.2 PRECORDIAL PAIN: Primary | ICD-10-CM

## 2024-04-29 DIAGNOSIS — R76.8 POSITIVE ANA (ANTINUCLEAR ANTIBODY): ICD-10-CM

## 2024-04-29 PROCEDURE — 3079F DIAST BP 80-89 MM HG: CPT | Performed by: PHYSICIAN ASSISTANT

## 2024-04-29 PROCEDURE — 99214 OFFICE O/P EST MOD 30 MIN: CPT | Performed by: PHYSICIAN ASSISTANT

## 2024-04-29 PROCEDURE — 1160F RVW MEDS BY RX/DR IN RCRD: CPT | Performed by: PHYSICIAN ASSISTANT

## 2024-04-29 PROCEDURE — 1159F MED LIST DOCD IN RCRD: CPT | Performed by: PHYSICIAN ASSISTANT

## 2024-04-29 PROCEDURE — 3074F SYST BP LT 130 MM HG: CPT | Performed by: PHYSICIAN ASSISTANT

## 2024-04-29 PROCEDURE — 93000 ELECTROCARDIOGRAM COMPLETE: CPT | Performed by: PHYSICIAN ASSISTANT

## 2024-04-29 RX ORDER — POTASSIUM CHLORIDE 20 MEQ/1
1 TABLET, EXTENDED RELEASE ORAL DAILY
COMMUNITY
Start: 2024-04-24

## 2024-04-29 NOTE — TELEPHONE ENCOUNTER
----- Message from Pati BARCENAS sent at 4/25/2024  1:18 PM EDT -----  Office follow-up 1 to 2 weeks to discuss cardiac catheterization based on the abnormal stress test finding and symptoms  ----- Message -----  From: Jovany Biggs MD  Sent: 4/23/2024   7:54 PM EDT  To: Jovany Biggs MD

## 2024-04-29 NOTE — TELEPHONE ENCOUNTER
----- Message from Corinne LOZANO sent at 4/28/2024  3:45 PM EDT -----  ISABELL abnormal.  Copy forward to PCP.  If the patient would like, we can refer her on to rheumatology.  ----- Message -----  From: Pati Coles RegSched Rep  Sent: 4/22/2024  12:02 PM EDT  To: ENEIDA Chapman      ----- Message -----  From: Lab, Background User  Sent: 4/3/2024   1:53 PM EDT  To: Jovany Biggs MD    ISABELL  Order: [id number]  Status: Final result       Visible to patient: No (not released)       Dx: Shortness of breath; Precordial pain;...    Specimen Information: Blood   0 Result Notes      Component  Ref Range & Units 3 wk ago   ISABELL Direct  Negative Positive Abnormal    Resulting Agency [facility]              Narrative  Performed by: [facility]  Performed at:  01 - [facility] [address]  [id number]  : Edgar Salvador PhD, Phone:  [phone]      Specimen C

## 2024-04-29 NOTE — PROGRESS NOTES
Problem list     Subjective   Sandra Nova is a 63 y.o. female     Chief Complaint   Patient presents with   • Follow-up     Abnormal stress test   • Shortness of Breath   • Chest Pain   • Palpitations   PROBLEM LIST:       Chest Pain  Shortness of breath  HTN  Hyperlipidemia  DOUG, uses CPAP. Followed by Dr. Molina.   Remote lacunar infarct left thalamus per MRI brain at [facility] on 10-3-2018  Peptic ulcer disease s/p partial colectomy    HPI  The patient presents in the clinic today for follow-up.  This patient was seen at last evaluation and scheduled for noninvasive testing.  She presents today to discuss study results.  Stress test was performed and suggested possible apical and inferolateral ischemia.  Post stress EF was 60%.  Echo indicated normal systolic function, of note EF 50 to 55%, with no significant valvular nor structural abnormalities.  We have reviewed these findings in detail with the patient.  She still has some degree of symptoms.  She still notes chest pain.  She has ongoing dyspnea.  Symptoms are limiting.  She denies failure nor dysrhythmic symptoms.  She has no further complaints at this time.    Current Outpatient Medications on File Prior to Visit   Medication Sig Dispense Refill   • allopurinol (ZYLOPRIM) 300 MG tablet Take 1 tablet by mouth Daily.     • aspirin 81 MG EC tablet Take 1 tablet by mouth Daily. 30 tablet 11   • atorvastatin (LIPITOR) 20 MG tablet Take 1 tablet by mouth Daily. 30 tablet 11   • betamethasone dipropionate (DIPROSONE) 0.05 % cream APPLY A THIN LAYER TO THE AFFECTED AREA(S) BY TOPICAL ROUTE ONCE DAILY AS NEEDED     • clobetasol (TEMOVATE) 0.05 % external solution APPLY TO THE AFFECTED SCALP AREA BY TOPICAL ROUTE 2 TIMES PER DAY IN THE MORNING AND EVENING     • CloNIDine (CATAPRES) 0.1 MG tablet Take 1 tablet by mouth Daily As Needed.     • diclofenac (VOLTAREN) 50 MG EC tablet Take 1 tablet by mouth 2 (Two) Times a Day As Needed.     • DULoxetine (CYMBALTA) 60 MG  capsule Take 1 capsule by mouth 2 (Two) Times a Day.     • furosemide (LASIX) 40 MG tablet Take 1 tablet by mouth Daily.     • ketoconazole (NIZORAL) 2 % shampoo Apply  topically to the appropriate area as directed Every Other Day.     • methocarbamol (ROBAXIN) 750 MG tablet TAKE 1 TABLET EVERY NIGHT (Patient taking differently: Take 1 tablet by mouth 2 (Two) Times a Day As Needed.) 30 tablet 0   • metoprolol succinate XL (TOPROL-XL) 50 MG 24 hr tablet Take 1 tablet by mouth Daily.     • potassium chloride (KLOR-CON M20) 20 MEQ CR tablet Take 1 tablet by mouth Daily.     • topiramate (TOPAMAX) 50 MG tablet Take 1 tablet by mouth Every Night.       No current facility-administered medications on file prior to visit.       Patient has no known allergies.    Past Medical History:   Diagnosis Date   • CKD (chronic kidney disease)    • DDD (degenerative disc disease), lumbar    • Depression    • Diverticulitis    • Hyperlipidemia    • Hypertension    • Lumbar disc disease with radiculopathy    • Obesity    • Peripheral edema    • PUD (peptic ulcer disease)    • Sleep apnea     uses CPAP. followed by Dr. Molina.   • Stroke     remote lacunar infarct left thalamus   • Vitamin D deficiency disease        Social History     Socioeconomic History   • Marital status:    Tobacco Use   • Smoking status: Never   • Smokeless tobacco: Never   Substance and Sexual Activity   • Alcohol use: No   • Drug use: No   • Sexual activity: Defer       Family History   Problem Relation Age of Onset   • Heart attack Mother    • Hypertension Mother    • Atrial fibrillation Mother    • Hypertension Father    • Diabetes Father    • Kidney disease Father        Review of Systems   Constitutional:  Positive for fatigue. Negative for chills, diaphoresis and fever.   Eyes: Negative.  Negative for visual disturbance.   Respiratory:  Positive for apnea (wears c-pap), shortness of breath and wheezing. Negative for cough and chest tightness.   "  Cardiovascular:  Positive for chest pain and palpitations.   Gastrointestinal: Negative.  Negative for abdominal pain and blood in stool.   Endocrine: Negative.    Genitourinary: Negative.  Negative for hematuria.   Musculoskeletal:  Positive for back pain. Negative for arthralgias, myalgias, neck pain and neck stiffness.   Skin: Negative.  Negative for rash and wound.   Allergic/Immunologic: Negative.  Negative for environmental allergies and food allergies.   Neurological:  Positive for weakness and headaches. Negative for dizziness, syncope, light-headedness and numbness.   Hematological:  Bruises/bleeds easily (bruises easily).   Psychiatric/Behavioral:  Positive for sleep disturbance (sleep apnea).        Objective   Vitals:    04/29/24 1440   BP: 116/84   Pulse: 120   SpO2: 97%   Weight: 93.2 kg (205 lb 6.4 oz)   Height: 170.2 cm (67\")      /84   Pulse 120   Ht 170.2 cm (67\")   Wt 93.2 kg (205 lb 6.4 oz)   SpO2 97%   BMI 32.17 kg/m²    Lab Results (most recent)       None          Physical Exam  Vitals and nursing note reviewed.   Constitutional:       General: She is not in acute distress.     Appearance: She is well-developed.   HENT:      Head: Normocephalic and atraumatic.   Eyes:      Conjunctiva/sclera: Conjunctivae normal.      Pupils: Pupils are equal, round, and reactive to light.   Neck:      Vascular: No JVD.      Trachea: No tracheal deviation.   Cardiovascular:      Rate and Rhythm: Normal rate and regular rhythm.      Heart sounds: Normal heart sounds.   Pulmonary:      Effort: Pulmonary effort is normal.      Breath sounds: Normal breath sounds.   Abdominal:      General: Bowel sounds are normal. There is no distension.      Palpations: Abdomen is soft. There is no mass.      Tenderness: There is no abdominal tenderness. There is no guarding or rebound.   Musculoskeletal:         General: No tenderness or deformity. Normal range of motion.      Cervical back: Normal range of motion " and neck supple.      Right lower le+ Edema present.      Left lower le+ Edema present.   Skin:     General: Skin is warm and dry.      Coloration: Skin is not pale.      Findings: No erythema or rash.   Neurological:      Mental Status: She is alert and oriented to person, place, and time.   Psychiatric:         Behavior: Behavior normal.         Thought Content: Thought content normal.         Judgment: Judgment normal.         Procedure     ECG 12 Lead    Date/Time: 2024 2:47 PM  Performed by: Aki Pena PA    Authorized by: Aki Pena PA  Comparison: compared with previous ECG from 2024  Comparison to previous ECG: Sinus tachycardia, rate 119, nonspecific ST and T wave changes, no acute changes noted.           Assessment & Plan      Diagnosis Plan   1. Precordial pain  CBC & Differential    Basic Metabolic Panel    [facility] Cath      2. Shortness of breath  CBC & Differential    Basic Metabolic Panel    [facility] Cath      3. Abnormal stress test  CBC & Differential    Basic Metabolic Panel    [facility] Cath      4. Positive ISABELL (antinuclear antibody)  Ambulatory Referral to Rheumatology        1.  The patient presents in to review stress test findings.  Because of symptoms of chest pain, dyspnea, and issues otherwise as above, she was scheduled for testing.  Stress test questioned apical and possible inferolateral wall ischemia.  We wanted to see her back today to discuss options.    2.  She still has some degree of symptoms.  With ongoing symptoms, abnormal stress test, and risk factors, catheterization is felt indicated.  She will be scheduled accordingly.    3.  For now, I would continue medical regimen without change.  We can augment further as we know results of cath.    4.  Because of recent abnormal ISABELL, the patient would like referral to rheumatology.  If okay with her primary care provider, we will refer her accordingly.    5.  Nothing further and we  will see her after cath findings are known.                    Electronically signed by:

## 2024-05-02 ENCOUNTER — LAB (OUTPATIENT)
Dept: LAB | Facility: HOSPITAL | Age: 64
End: 2024-05-02
Payer: MEDICARE

## 2024-05-02 DIAGNOSIS — R07.2 PRECORDIAL PAIN: ICD-10-CM

## 2024-05-02 DIAGNOSIS — R94.39 ABNORMAL STRESS TEST: ICD-10-CM

## 2024-05-02 DIAGNOSIS — E78.2 MIXED HYPERLIPIDEMIA: ICD-10-CM

## 2024-05-02 DIAGNOSIS — R06.02 SHORTNESS OF BREATH: ICD-10-CM

## 2024-05-02 LAB
ALBUMIN SERPL-MCNC: 4.3 G/DL (ref 3.5–5.2)
ALP SERPL-CCNC: 115 U/L (ref 39–117)
ALT SERPL W P-5'-P-CCNC: 16 U/L (ref 1–33)
AST SERPL-CCNC: 22 U/L (ref 1–32)
BASOPHILS # BLD AUTO: 0.06 10*3/MM3 (ref 0–0.2)
BASOPHILS NFR BLD AUTO: 0.9 % (ref 0–1.5)
BILIRUB CONJ SERPL-MCNC: 0.2 MG/DL (ref 0–0.3)
BILIRUB INDIRECT SERPL-MCNC: 0.6 MG/DL
BILIRUB SERPL-MCNC: 0.8 MG/DL (ref 0–1.2)
CHOLEST SERPL-MCNC: 132 MG/DL (ref 0–200)
DEPRECATED RDW RBC AUTO: 44.1 FL (ref 37–54)
EOSINOPHIL # BLD AUTO: 0.25 10*3/MM3 (ref 0–0.4)
EOSINOPHIL NFR BLD AUTO: 3.8 % (ref 0.3–6.2)
ERYTHROCYTE [DISTWIDTH] IN BLOOD BY AUTOMATED COUNT: 12.8 % (ref 12.3–15.4)
HCT VFR BLD AUTO: 43.1 % (ref 34–46.6)
HDLC SERPL-MCNC: 60 MG/DL (ref 40–60)
HGB BLD-MCNC: 13.9 G/DL (ref 12–15.9)
IMM GRANULOCYTES # BLD AUTO: 0.02 10*3/MM3 (ref 0–0.05)
IMM GRANULOCYTES NFR BLD AUTO: 0.3 % (ref 0–0.5)
LDLC SERPL CALC-MCNC: 52 MG/DL (ref 0–100)
LDLC/HDLC SERPL: 0.84 {RATIO}
LYMPHOCYTES # BLD AUTO: 2.73 10*3/MM3 (ref 0.7–3.1)
LYMPHOCYTES NFR BLD AUTO: 41.4 % (ref 19.6–45.3)
MCH RBC QN AUTO: 30.1 PG (ref 26.6–33)
MCHC RBC AUTO-ENTMCNC: 32.3 G/DL (ref 31.5–35.7)
MCV RBC AUTO: 93.3 FL (ref 79–97)
MONOCYTES # BLD AUTO: 0.48 10*3/MM3 (ref 0.1–0.9)
MONOCYTES NFR BLD AUTO: 7.3 % (ref 5–12)
NEUTROPHILS NFR BLD AUTO: 3.06 10*3/MM3 (ref 1.7–7)
NEUTROPHILS NFR BLD AUTO: 46.3 % (ref 42.7–76)
NRBC BLD AUTO-RTO: 0 /100 WBC (ref 0–0.2)
PLATELET # BLD AUTO: 252 10*3/MM3 (ref 140–450)
PMV BLD AUTO: 10.6 FL (ref 6–12)
PROT SERPL-MCNC: 7.5 G/DL (ref 6–8.5)
RBC # BLD AUTO: 4.62 10*6/MM3 (ref 3.77–5.28)
TRIGL SERPL-MCNC: 109 MG/DL (ref 0–150)
VLDLC SERPL-MCNC: 20 MG/DL (ref 5–40)
WBC NRBC COR # BLD AUTO: 6.6 10*3/MM3 (ref 3.4–10.8)

## 2024-05-02 PROCEDURE — 80048 BASIC METABOLIC PNL TOTAL CA: CPT | Performed by: PHYSICIAN ASSISTANT

## 2024-05-02 PROCEDURE — 85025 COMPLETE CBC W/AUTO DIFF WBC: CPT

## 2024-05-02 PROCEDURE — 80061 LIPID PANEL: CPT

## 2024-05-02 PROCEDURE — 80076 HEPATIC FUNCTION PANEL: CPT

## 2024-05-03 ENCOUNTER — TELEPHONE (OUTPATIENT)
Dept: CARDIOLOGY | Facility: CLINIC | Age: 64
End: 2024-05-03
Payer: MEDICARE

## 2024-05-03 NOTE — TELEPHONE ENCOUNTER
Name: DENI CARRILLO    Relationship: SELF    Best Callback Number: [phone]    [facility]UB PROVIDED THE RELAY MESSAGE FROM THE OFFICE   PATIENT VOICED UNDERSTANDING AND HAS NO FURTHER QUESTIONS AT THIS TIME    ADDITIONAL INFORMATION:

## 2024-05-03 NOTE — TELEPHONE ENCOUNTER
"Relay     \"Calling to inform you of your heart cath scheduled at [facility] with Dr. Everett on 05/13/2024 at 10AM.  Instructions:  -Arrive at 8AM  -NPO after midnight the night before  -Continue all medications as normal  -Must have a   -Follow up with Becky on 6/4 @ 10:30AM or sooner if you have stents placed. Thank you \"                  "

## 2024-05-06 ENCOUNTER — TELEPHONE (OUTPATIENT)
Dept: CARDIOLOGY | Facility: CLINIC | Age: 64
End: 2024-05-06
Payer: MEDICARE

## 2024-05-16 ENCOUNTER — TELEPHONE (OUTPATIENT)
Dept: CARDIOLOGY | Facility: CLINIC | Age: 64
End: 2024-05-16
Payer: MEDICARE

## 2024-05-16 NOTE — TELEPHONE ENCOUNTER
Aki Pena PA Worley, Lois J, MA  Routine follow-up.          Previous Messages    Mobile Cardiac Outpatient Telemetry  Order: [id number]  Status: Final result       Visible to patient: No (not released)       Dx: Lacunar infarction; Dyspnea, unspecif...    0 Result Notes  Details    Reading Physician Reading Date Result Priority   Jovany Biggs MD  [phone] 4/27/2024 Routine     Result Text       Findings 1.  Sinus rhythm is the baseline rhythm throughout the study.  Maximum heart rate of 162 and minimum heart rate 56 bpm. 2.  Rare PVC noted with ectopic burden less than 1%.  No symptoms documented. 3.  No other arrhythmia, ectopy, or block noted. 4.  No symptoms documented.        Patient notified of result's and recommendation's to keep routine follow up appointment.

## 2024-05-16 NOTE — TELEPHONE ENCOUNTER
Aki Pena PA Worley, Lois J, MA  Routine follow-up.          Previous Messages    Adult Transthoracic Echo Complete W/ Cont if Necessary Per Protocol  Order: [id number]  Status: Final result       Visible to patient: No (not released)       Dx: Mixed hyperlipidemia; Shortness of br...    0 Result Notes  Details    Reading Physician Reading Date Result Priority   Jovany Biggs MD  [phone] 4/28/2024 Routine     Result Text  Technically adequate study.     1.  LV size, function, and wall motion are normal.  Mild concentric left ventricular hypertrophy.  Visually estimated ejection fraction is 50 to 55%.  Grade 1A diastolic dysfunction.  Mild biatrial enlargement.  RV size and function are preserved.  No septal defect or intracavitary mass or thrombus.     2.  Valves are morphologically normal with no stenotic lesions or valve associated masses or thrombi.  There is trivial MR and TR.     3.  No pericardial or great vessel pathology.     4.  Right heart pressures cannot be calculated.          Patient notified of result's and recommendation's.

## 2024-05-16 NOTE — TELEPHONE ENCOUNTER
Aki Pena, Corinne Santos, MA  Lipid parameters demonstrate excellent control.  Chemistry profile unremarkable.          Patient notified of result's.

## 2024-05-21 ENCOUNTER — TELEPHONE (OUTPATIENT)
Dept: CARDIOLOGY | Facility: CLINIC | Age: 64
End: 2024-05-21
Payer: MEDICARE

## 2024-05-21 NOTE — TELEPHONE ENCOUNTER
Aki Pena, Corinne Santos, MA  Routine follow-up.          Previous Messages    [facility] [[id number]] (Order [id number])  Order    Patient notified of heart cath follow up on 6/4/24 at 10:30 am she was instructed to bring in her medication's with visit.

## 2024-05-29 ENCOUNTER — TELEPHONE (OUTPATIENT)
Dept: CARDIOLOGY | Facility: CLINIC | Age: 64
End: 2024-05-29
Payer: MEDICARE

## 2024-05-29 NOTE — TELEPHONE ENCOUNTER
Received cardiac clearance request from DR GERONIMO CROCKETT stating pt has CLOSED FRACTURE OF DISTAL END OF RADIUS scheduled for 05/31/2024 and is requiring a cardiac clearance. Placed cardiac clearance request in DUSTIN' inbox to review and address with provider.

## 2024-05-29 NOTE — LETTER
May 29, 2024       To Whom It May Concern:    We build our practice on integrity and patient care. The highest compliment we can receive is the referral of friends, family and patients to our office. We want to take this time to thank you for considering our group as part of your care team.    The medical records for Sandra Nova 1960 were reviewed for pre-operative clearance on 05/29/2024. It has been determined that this patient has:  ACCEPTABLE RISK.    Sandra Nova is currently on the following medications that will need to be held prior to surgery: MAY HOLD ASPIRIN AS NEEDED.    This pre-operative evaluation has been completed based on patient reported medical conditions at the date of this letter, this evaluation may have considered in part results of additional testing, completion of an EKG and patient reported symptoms at the time of their visit.    Sandra Nova's pre-operative clearance is good for thirty(30) days from the date of this letter with no reported changes in health, symptoms or diagnosis from the patient, their primary care provider or your office.    Your office has reported the patient will under go WRIST SURGERY OF CLOSED FRACTURE OF DISTAL END OF RADIUS on 05/31/2024 with Dr. GERONIMO CROCKETT. If this appointment is changed or moved outside of thirty(30) days from 05/29/2024 this pre-operative clearance is void.    If you have any further questions please give our office a call.    Thank you for your trust,      ENEIDA Farrell

## 2024-06-04 ENCOUNTER — OFFICE VISIT (OUTPATIENT)
Dept: CARDIOLOGY | Facility: CLINIC | Age: 64
End: 2024-06-04
Payer: MEDICARE

## 2024-06-04 VITALS
HEIGHT: 67 IN | WEIGHT: 212 LBS | OXYGEN SATURATION: 97 % | DIASTOLIC BLOOD PRESSURE: 74 MMHG | SYSTOLIC BLOOD PRESSURE: 148 MMHG | BODY MASS INDEX: 33.27 KG/M2 | HEART RATE: 83 BPM

## 2024-06-04 DIAGNOSIS — R06.02 SHORTNESS OF BREATH: ICD-10-CM

## 2024-06-04 DIAGNOSIS — R07.2 PRECORDIAL PAIN: Primary | ICD-10-CM

## 2024-06-04 DIAGNOSIS — R53.82 CHRONIC FATIGUE: ICD-10-CM

## 2024-06-04 PROCEDURE — 1160F RVW MEDS BY RX/DR IN RCRD: CPT | Performed by: PHYSICIAN ASSISTANT

## 2024-06-04 PROCEDURE — 1159F MED LIST DOCD IN RCRD: CPT | Performed by: PHYSICIAN ASSISTANT

## 2024-06-04 PROCEDURE — 99213 OFFICE O/P EST LOW 20 MIN: CPT | Performed by: PHYSICIAN ASSISTANT

## 2024-06-04 PROCEDURE — 3078F DIAST BP <80 MM HG: CPT | Performed by: PHYSICIAN ASSISTANT

## 2024-06-04 PROCEDURE — 3077F SYST BP >= 140 MM HG: CPT | Performed by: PHYSICIAN ASSISTANT

## 2024-06-04 NOTE — PROGRESS NOTES
Problem list     Subjective   Sandra Nova is a 63 y.o. female     Chief Complaint   Patient presents with    Follow-up     Heart cath - no stents, Dr. Everett  Patient denies chest pain, shortness of breath or palpitation's     Problem list:  1.  Minor nonobstructive CAD per catheterization, 5/2024.  2.  Preserved systolic function.  3.  Hypertension  4.  Dyslipidemia.  HPI    The patient presents in for Follow-up.  She was scheduled for cath because of abnormal noninvasive studies and symptoms noted at the time.  Cath supported minor nonobstructive CAD.  Unfortunately, post cath, the patient had a fall with subsequent fracture of her arm.  She is now status postsurgical intervention.  She did well during surgery without cardiac complications.  Her cath site is very much stable.  She has no angina, failure, or dysrhythmic symptoms.  She has no further complaints and continues to do well.  Current Outpatient Medications on File Prior to Visit   Medication Sig Dispense Refill    allopurinol (ZYLOPRIM) 300 MG tablet Take 1 tablet by mouth Daily.      aspirin 81 MG EC tablet Take 1 tablet by mouth Daily. 30 tablet 11    atorvastatin (LIPITOR) 20 MG tablet Take 1 tablet by mouth Daily. 30 tablet 11    betamethasone dipropionate (DIPROSONE) 0.05 % cream APPLY A THIN LAYER TO THE AFFECTED AREA(S) BY TOPICAL ROUTE ONCE DAILY AS NEEDED      clobetasol (TEMOVATE) 0.05 % external solution APPLY TO THE AFFECTED SCALP AREA BY TOPICAL ROUTE 2 TIMES PER DAY IN THE MORNING AND EVENING      CloNIDine (CATAPRES) 0.1 MG tablet Take 1 tablet by mouth Daily As Needed.      diclofenac (VOLTAREN) 50 MG EC tablet Take 1 tablet by mouth 2 (Two) Times a Day As Needed.      DULoxetine (CYMBALTA) 60 MG capsule Take 1 capsule by mouth 2 (Two) Times a Day.      furosemide (LASIX) 40 MG tablet Take 1 tablet by mouth Daily.      ketoconazole (NIZORAL) 2 % shampoo Apply  topically to the appropriate area as directed Every Other Day.       methocarbamol (ROBAXIN) 750 MG tablet TAKE 1 TABLET EVERY NIGHT (Patient taking differently: Take 1 tablet by mouth 2 (Two) Times a Day As Needed.) 30 tablet 0    metoprolol succinate XL (TOPROL-XL) 50 MG 24 hr tablet Take 1 tablet by mouth Daily.      potassium chloride (KLOR-CON M20) 20 MEQ CR tablet Take 1 tablet by mouth Daily.      topiramate (TOPAMAX) 50 MG tablet Take 1 tablet by mouth Every Night.       No current facility-administered medications on file prior to visit.       Patient has no known allergies.    Past Medical History:   Diagnosis Date    CKD (chronic kidney disease)     DDD (degenerative disc disease), lumbar     Depression     Diverticulitis     Hyperlipidemia     Hypertension     Lumbar disc disease with radiculopathy     Obesity     Peripheral edema     PUD (peptic ulcer disease)     Sleep apnea     uses CPAP. followed by Dr. Molina.    Stroke     remote lacunar infarct left thalamus    Vitamin D deficiency disease        Social History     Socioeconomic History    Marital status:    Tobacco Use    Smoking status: Never    Smokeless tobacco: Never   Substance and Sexual Activity    Alcohol use: No    Drug use: No    Sexual activity: Defer       Family History   Problem Relation Age of Onset    Heart attack Mother     Hypertension Mother     Atrial fibrillation Mother     Hypertension Father     Diabetes Father     Kidney disease Father        Review of Systems   Constitutional:  Positive for fatigue. Negative for chills, diaphoresis and fever.   Eyes: Negative.  Negative for visual disturbance.   Respiratory:  Positive for apnea. Negative for cough, chest tightness, shortness of breath and wheezing.    Cardiovascular:  Positive for leg swelling. Negative for chest pain and palpitations.   Gastrointestinal: Negative.  Negative for abdominal pain and blood in stool.   Endocrine: Negative.    Genitourinary: Negative.  Negative for hematuria.   Musculoskeletal:  Positive for back pain.  "Negative for arthralgias, myalgias, neck pain and neck stiffness.   Skin: Negative.  Negative for rash and wound.   Allergic/Immunologic: Negative.  Negative for environmental allergies and food allergies.   Neurological:  Positive for headaches. Negative for dizziness, syncope, weakness, light-headedness and numbness.   Hematological:  Bruises/bleeds easily (bruises easily).   Psychiatric/Behavioral:  Positive for sleep disturbance (sleep apnea).        Objective   Vitals:    06/04/24 1050   BP: 148/74   Pulse: 83   SpO2: 97%   Weight: 96.2 kg (212 lb)   Height: 170.2 cm (67\")      /74   Pulse 83   Ht 170.2 cm (67\")   Wt 96.2 kg (212 lb)   SpO2 97%   BMI 33.20 kg/m²    Lab Results (most recent)       None          Physical Exam  Vitals and nursing note reviewed.   Constitutional:       General: She is not in acute distress.     Appearance: She is well-developed.   HENT:      Head: Normocephalic and atraumatic.   Eyes:      Conjunctiva/sclera: Conjunctivae normal.      Pupils: Pupils are equal, round, and reactive to light.   Neck:      Vascular: No JVD.      Trachea: No tracheal deviation.   Cardiovascular:      Rate and Rhythm: Normal rate and regular rhythm.      Heart sounds: Normal heart sounds.   Pulmonary:      Effort: Pulmonary effort is normal.      Breath sounds: Normal breath sounds.   Abdominal:      General: Bowel sounds are normal. There is no distension.      Palpations: Abdomen is soft. There is no mass.      Tenderness: There is no abdominal tenderness. There is no guarding or rebound.   Musculoskeletal:         General: No tenderness or deformity. Normal range of motion.      Cervical back: Normal range of motion and neck supple.   Skin:     General: Skin is warm and dry.      Coloration: Skin is not pale.      Findings: No erythema or rash.   Neurological:      Mental Status: She is alert and oriented to person, place, and time.   Psychiatric:         Behavior: Behavior normal.         " Thought Content: Thought content normal.         Judgment: Judgment normal.           Procedure   Procedures   Patient did not bring med list or medicine bottles to appointment, med list has been reviewed and updated based on patient's knowledge of their meds.      Assessment & Plan      Diagnosis Plan   1. Precordial pain        2. Shortness of breath        3. Chronic fatigue          1.  At this time, the patient appears to be doing well.  She reports no further chest pain.  Dyspnea and fatigue are at baseline.  She has no further issues.    2.  Cath supported minor nonobstructive CAD.  Echo supported mostly normal systolic function with no significant structural abnormalities.  With benign workup and stable clinical course now, nothing further.    3.  We will make no adjustments in medications.  We will continue to see her on a yearly evaluation at this time.                         Electronically signed by:

## 2024-06-26 ENCOUNTER — TELEPHONE (OUTPATIENT)
Dept: CARDIOLOGY | Facility: CLINIC | Age: 64
End: 2024-06-26
Payer: MEDICARE

## 2024-06-26 NOTE — TELEPHONE ENCOUNTER
For the HUB to read to pt:   CALLED PT TO NOTIFY OF APPT WITH RHEUMATOLOGY 08/20/2024 @11:15 UNABLE TO LVM

## 2025-03-21 DIAGNOSIS — I63.81 LACUNAR INFARCTION: ICD-10-CM

## 2025-03-21 DIAGNOSIS — R07.2 PRECORDIAL PAIN: ICD-10-CM

## 2025-03-21 RX ORDER — ASPIRIN 81 MG/1
81 TABLET, COATED ORAL DAILY
Qty: 30 TABLET | Refills: 11 | Status: SHIPPED | OUTPATIENT
Start: 2025-03-21

## 2025-04-19 DIAGNOSIS — E78.2 MIXED HYPERLIPIDEMIA: ICD-10-CM

## 2025-04-21 RX ORDER — ATORVASTATIN CALCIUM 20 MG/1
20 TABLET, FILM COATED ORAL DAILY
Qty: 30 TABLET | Refills: 11 | Status: SHIPPED | OUTPATIENT
Start: 2025-04-21